# Patient Record
Sex: MALE
[De-identification: names, ages, dates, MRNs, and addresses within clinical notes are randomized per-mention and may not be internally consistent; named-entity substitution may affect disease eponyms.]

---

## 2018-03-30 ENCOUNTER — RX ONLY (RX ONLY)
Age: 43
End: 2018-03-30

## 2018-03-30 ENCOUNTER — APPOINTMENT (OUTPATIENT)
Dept: URBAN - METROPOLITAN AREA SURGERY 9 | Age: 43
Setting detail: DERMATOLOGY
End: 2018-03-30

## 2018-03-30 DIAGNOSIS — L738 OTHER SPECIFIED DISEASES OF HAIR AND HAIR FOLLICLES: ICD-10-CM

## 2018-03-30 DIAGNOSIS — L663 OTHER SPECIFIED DISEASES OF HAIR AND HAIR FOLLICLES: ICD-10-CM

## 2018-03-30 PROBLEM — L30.9 DERMATITIS, UNSPECIFIED: Status: ACTIVE | Noted: 2018-03-30

## 2018-03-30 PROCEDURE — OTHER COUNSELING: OTHER

## 2018-03-30 PROCEDURE — OTHER ORDER TESTS: OTHER

## 2018-03-30 PROCEDURE — 99202 OFFICE O/P NEW SF 15 MIN: CPT

## 2018-03-30 PROCEDURE — OTHER PRESCRIPTION: OTHER

## 2018-03-30 PROCEDURE — OTHER TREATMENT REGIMEN: OTHER

## 2018-03-30 RX ORDER — SULFAMETHOXAZOLE AND TRIMETHOPRIM 800; 160 MG/1; MG/1
TABLET ORAL
Qty: 28 | Refills: 0 | Status: ERX

## 2018-03-30 ASSESSMENT — LOCATION SIMPLE DESCRIPTION DERM: LOCATION SIMPLE: LEFT UPPER ARM

## 2018-03-30 ASSESSMENT — LOCATION DETAILED DESCRIPTION DERM: LOCATION DETAILED: LEFT ANTERIOR PROXIMAL UPPER ARM

## 2018-03-30 ASSESSMENT — LOCATION ZONE DERM: LOCATION ZONE: ARM

## 2018-03-30 NOTE — PROCEDURE: TREATMENT REGIMEN
Plan: He did have another lesion on the inferior axilla that was indurated with very minimal central fluctuance.  I offered I&D, he deferred at this time and would like to see how the antibiotics and warm compresses do.  \\n\\nWe will see what the culture shows and how he responds to treatment.  He denies having this in any other area.
Otc Regimen: Ibuprophen and warm compresses
Detail Level: Detailed

## 2018-04-27 ENCOUNTER — APPOINTMENT (OUTPATIENT)
Dept: URBAN - METROPOLITAN AREA SURGERY 9 | Age: 43
Setting detail: DERMATOLOGY
End: 2018-04-27

## 2018-04-27 DIAGNOSIS — L73.2 HIDRADENITIS SUPPURATIVA: ICD-10-CM

## 2018-04-27 PROBLEM — L30.9 DERMATITIS, UNSPECIFIED: Status: ACTIVE | Noted: 2018-04-27

## 2018-04-27 PROCEDURE — OTHER ORDER TESTS: OTHER

## 2018-04-27 PROCEDURE — OTHER TREATMENT REGIMEN: OTHER

## 2018-04-27 PROCEDURE — 11900 INJECT SKIN LESIONS </W 7: CPT

## 2018-04-27 PROCEDURE — OTHER PRESCRIPTION: OTHER

## 2018-04-27 PROCEDURE — OTHER COUNSELING: OTHER

## 2018-04-27 PROCEDURE — OTHER INTRALESIONAL KENALOG: OTHER

## 2018-04-27 PROCEDURE — OTHER OTHER: OTHER

## 2018-04-27 RX ORDER — SULFAMETHOXAZOLE AND TRIMETHOPRIM 800; 160 MG/1; MG/1
TABLET ORAL
Qty: 60 | Refills: 0 | Status: ERX

## 2018-04-27 RX ORDER — CLINDAMYCIN PHOSPHATE 10 MG/ML
LOTION TOPICAL
Qty: 1 | Refills: 5 | Status: ERX | COMMUNITY
Start: 2018-04-27

## 2018-04-27 ASSESSMENT — LOCATION DETAILED DESCRIPTION DERM: LOCATION DETAILED: LEFT AXILLARY VAULT

## 2018-04-27 ASSESSMENT — LOCATION ZONE DERM: LOCATION ZONE: AXILLAE

## 2018-04-27 ASSESSMENT — LOCATION SIMPLE DESCRIPTION DERM: LOCATION SIMPLE: LEFT AXILLARY VAULT

## 2018-04-27 NOTE — PROCEDURE: TREATMENT REGIMEN
Initiate Treatment: Clindamycin lotion once to twice daily \\nBactrim BID for one month
Otc Regimen: BPO wash in shower to AA area
Detail Level: Zone

## 2018-04-27 NOTE — PROCEDURE: OTHER
Detail Level: Zone
Other (Free Text): Clinically appearing as HS.  2-3 nodular linear areas underneath the skin with one pustule from where culture was taken.  Left axilla is only area affected.  No prior history of this kind of eruption.  Multiple PIH patches from past breakouts.  Will recheck progress in 1 month.  Call with any issues.
Note Text (......Xxx Chief Complaint.): This diagnosis correlates with the

## 2018-04-27 NOTE — PROCEDURE: INTRALESIONAL KENALOG
Medical Necessity Clause: This procedure was medically necessary because the lesions that were treated were:
Concentration Of Solution Injected (Mg/Ml): 5.0
Kenalog Preparation: Kenalog
Total Volume Injected (Ccs- Only Use Numbers And Decimals): 1
X Size Of Lesion In Cm (Optional): 0
Include Z78.9 (Other Specified Conditions Influencing Health Status) As An Associated Diagnosis?: No
Consent: The risks of atrophy were reviewed with the patient.
Detail Level: Simple

## 2018-05-29 ENCOUNTER — APPOINTMENT (OUTPATIENT)
Dept: URBAN - METROPOLITAN AREA SURGERY 9 | Age: 43
Setting detail: DERMATOLOGY
End: 2018-06-01

## 2018-05-29 DIAGNOSIS — L73.2 HIDRADENITIS SUPPURATIVA: ICD-10-CM

## 2018-05-29 PROBLEM — L30.9 DERMATITIS, UNSPECIFIED: Status: ACTIVE | Noted: 2018-05-29

## 2018-05-29 PROCEDURE — OTHER INCISION AND DRAINAGE: OTHER

## 2018-05-29 PROCEDURE — 10060 I&D ABSCESS SIMPLE/SINGLE: CPT

## 2018-05-29 PROCEDURE — OTHER ORDER TESTS: OTHER

## 2018-05-29 PROCEDURE — OTHER COUNSELING: OTHER

## 2018-05-29 PROCEDURE — OTHER OTHER: OTHER

## 2018-05-29 ASSESSMENT — LOCATION DETAILED DESCRIPTION DERM: LOCATION DETAILED: LEFT AXILLARY VAULT

## 2018-05-29 ASSESSMENT — LOCATION ZONE DERM: LOCATION ZONE: AXILLAE

## 2018-05-29 ASSESSMENT — LOCATION SIMPLE DESCRIPTION DERM: LOCATION SIMPLE: LEFT AXILLARY VAULT

## 2018-05-29 NOTE — PROCEDURE: OTHER
Other (Free Text): Although clinically appearing as hidradenitis suppurativa, I had wanted to rule out a deeper abcess, and even a multilocated abcess that is not being controlled with oral ATB only; multiple ATB courses have been rendered with continued drainage, even today.  Will make further recommendations pending culture results; in the mean time, I would like Silviano to have a consult with a general surgeon for further evaluation/perhaps imaging and consider surgical debridement if there is a deeper component of an abscess.  He verbalized understanding and is agreeable for this consult.  Call with any worsening of area prior to getting culture results.
Note Text (......Xxx Chief Complaint.): This diagnosis correlates with the
Detail Level: Simple

## 2018-05-29 NOTE — PROCEDURE: INCISION AND DRAINAGE
Wound Care: Vaseline
Detail Level: Detailed
Consent was obtained and risks were reviewed including but not limited to delayed wound healing, infection, need for multiple I and D's, and pain.
Curette Text (Optional): After the contents were expressed a curette was used to partially remove the cyst wall.
Drainage Type?: purulent
Preparation Text: The area was prepped in the usual clean fashion.
Size Of Lesion In Cm (Optional But May Be Required For Some Insurances): 0
Curette: No
Post-Care Instructions: I reviewed with the patient in detail post-care instructions. Patient should keep wound covered and call the office should any redness, pain, swelling or worsening occur.
Drainage Amount?: significant
Anesthesia Type: 1% lidocaine with epinephrine
Epidermal Closure: simple interrupted
Method: sterile needle
Lesion Type: Abscess
Suture Text: The incision was partially closed with
Dressing: pressure dressing
Anesthesia Volume In Cc: 0.5
Epidermal Sutures: 4-0 Ethilon

## 2018-06-01 ENCOUNTER — RX ONLY (RX ONLY)
Age: 43
End: 2018-06-01

## 2018-06-01 RX ORDER — DOXYCYCLINE HYCLATE 100 MG/1
CAPSULE, GELATIN COATED ORAL
Qty: 20 | Refills: 0 | Status: ERX | COMMUNITY
Start: 2018-06-01

## 2018-06-19 ENCOUNTER — RX ONLY (RX ONLY)
Age: 43
End: 2018-06-19

## 2018-06-19 RX ORDER — DOXYCYCLINE HYCLATE 100 MG/1
CAPSULE, GELATIN COATED ORAL
Qty: 60 | Refills: 0 | Status: ERX